# Patient Record
Sex: FEMALE | Race: WHITE | URBAN - METROPOLITAN AREA
[De-identification: names, ages, dates, MRNs, and addresses within clinical notes are randomized per-mention and may not be internally consistent; named-entity substitution may affect disease eponyms.]

---

## 2017-03-12 ENCOUNTER — TELEPHONE (OUTPATIENT)
Dept: NURSING | Facility: CLINIC | Age: 37
End: 2017-03-12

## 2017-03-12 NOTE — TELEPHONE ENCOUNTER
"Call Type: Triage Call    Presenting Problem: I called earlier, and I had double vision. Now it  is gone, it lasted about 30 minutes. I don't have a headache, I do  feel a little dizzy, but I haven't eaten all day. I think I have dry  eye, so I am going to go to the store and get some drops. \" Advised  if this does not help, if symptoms recur or headache develops  to go  to ED as previously advised.  Triage Note:  Guideline Title: Information Only Call; No Symptom Triage (Adult)  Recommended Disposition: Follow-up Assessment Call  Original Inclination: Wanted to speak with a nurse  Override Disposition:  Intended Action: Follow advice given  Physician Contacted: No  Call back to complete assessment/clarification of information from prior caller to  complete triage ?  YES  Sign(s) or symptom(s) associated with a diagnosed condition or with a new illness  ? NO  Requesting information about provider, services or community resources ? NO  Physician Instructions:  Care Advice:  "

## 2017-03-12 NOTE — TELEPHONE ENCOUNTER
"Call Type: Triage Call    Presenting Problem: \"I  have double vison.\"  Patient states she began  to have double vision in both eyes approximately 15 minutes ago.  Triage Note:  Guideline Title: Eye: Pain or Vision Change  Recommended Disposition: See ED Immediately  Original Inclination: Would have called UC  Override Disposition:  Intended Action: Go to Hospital / ED  Physician Contacted: No  Sudden loss or change in vision (double or blurred vision, increased light  sensitivity, partial loss of visual field) AND not previously evaluated ?  YES  Injury to eye OR chemical splash or spray ? NO  Sudden total loss of vision in one or both eyes ? NO  Eye symptoms part of typical migraine headache pattern ? NO  Sudden onset of severe pain in or around or behind eye(s) ? NO  Sudden vision change associated with new difficulty speaking/swallowing; using an  arm/leg; or new one-sided weakness (face drooping) ? NO  Physician Instructions:  Care Advice: Protect the patient from falling or other harm.  Another adult should drive.  Wear dark UV-blocking sunglasses to protect eyes from sun exposure or for  light sensitivity.  IMMEDIATE ACTION  Write down provider's name. List or place the following in a bag for  transport with the patient: current prescription and/or nonprescription  medications  alternative treatments, therapies and medications  and street drugs.  "

## 2017-07-08 ENCOUNTER — HEALTH MAINTENANCE LETTER (OUTPATIENT)
Age: 37
End: 2017-07-08

## 2017-10-26 ENCOUNTER — HOSPITAL ENCOUNTER (INPATIENT)
Facility: CLINIC | Age: 37
LOS: 7 days | Discharge: HOME | DRG: 883 | End: 2017-11-02
Attending: PSYCHIATRY & NEUROLOGY | Admitting: PSYCHIATRY & NEUROLOGY
Payer: MEDICARE

## 2017-10-26 ENCOUNTER — TRANSFERRED RECORDS (OUTPATIENT)
Dept: HEALTH INFORMATION MANAGEMENT | Facility: CLINIC | Age: 37
End: 2017-10-26

## 2017-10-26 DIAGNOSIS — R45.851 SUICIDAL IDEATION: ICD-10-CM

## 2017-10-26 DIAGNOSIS — F41.1 GAD (GENERALIZED ANXIETY DISORDER): ICD-10-CM

## 2017-10-26 DIAGNOSIS — F33.1 MAJOR DEPRESSIVE DISORDER, RECURRENT EPISODE, MODERATE (H): ICD-10-CM

## 2017-10-26 PROBLEM — F32.A DEPRESSED: Status: ACTIVE | Noted: 2017-10-26

## 2017-10-26 LAB
AMPHETAMINES UR QL SCN: NEGATIVE
BARBITURATES UR QL: NEGATIVE
BENZODIAZ UR QL: NEGATIVE
CANNABINOIDS UR QL SCN: NEGATIVE
COCAINE UR QL: NEGATIVE
ETHANOL UR QL SCN: NEGATIVE
HCG UR QL: NEGATIVE
OPIATES UR QL SCN: NEGATIVE

## 2017-10-26 PROCEDURE — 90791 PSYCH DIAGNOSTIC EVALUATION: CPT

## 2017-10-26 PROCEDURE — 99285 EMERGENCY DEPT VISIT HI MDM: CPT | Mod: Z6 | Performed by: PSYCHIATRY & NEUROLOGY

## 2017-10-26 PROCEDURE — 25000132 ZZH RX MED GY IP 250 OP 250 PS 637: Mod: GY | Performed by: PSYCHIATRY & NEUROLOGY

## 2017-10-26 PROCEDURE — 80307 DRUG TEST PRSMV CHEM ANLYZR: CPT | Performed by: FAMILY MEDICINE

## 2017-10-26 PROCEDURE — 80320 DRUG SCREEN QUANTALCOHOLS: CPT | Performed by: FAMILY MEDICINE

## 2017-10-26 PROCEDURE — 81025 URINE PREGNANCY TEST: CPT | Performed by: FAMILY MEDICINE

## 2017-10-26 PROCEDURE — 99285 EMERGENCY DEPT VISIT HI MDM: CPT | Mod: 25 | Performed by: PSYCHIATRY & NEUROLOGY

## 2017-10-26 PROCEDURE — 12400001 ZZH R&B MH UMMC

## 2017-10-26 PROCEDURE — A9270 NON-COVERED ITEM OR SERVICE: HCPCS | Mod: GY | Performed by: PSYCHIATRY & NEUROLOGY

## 2017-10-26 RX ORDER — LAMOTRIGINE 200 MG/1
200 TABLET ORAL 2 TIMES DAILY
COMMUNITY

## 2017-10-26 RX ORDER — HYDROXYZINE HYDROCHLORIDE 25 MG/1
25-50 TABLET, FILM COATED ORAL EVERY 4 HOURS PRN
Status: DISCONTINUED | OUTPATIENT
Start: 2017-10-26 | End: 2017-11-02 | Stop reason: HOSPADM

## 2017-10-26 RX ORDER — OLANZAPINE 10 MG/2ML
5 INJECTION, POWDER, FOR SOLUTION INTRAMUSCULAR
Status: DISCONTINUED | OUTPATIENT
Start: 2017-10-26 | End: 2017-11-01

## 2017-10-26 RX ORDER — TRAZODONE HYDROCHLORIDE 50 MG/1
50 TABLET, FILM COATED ORAL
Status: DISCONTINUED | OUTPATIENT
Start: 2017-10-26 | End: 2017-11-02 | Stop reason: HOSPADM

## 2017-10-26 RX ORDER — LITHIUM CARBONATE 300 MG
300 TABLET ORAL DAILY
Status: ON HOLD | COMMUNITY
End: 2017-11-02

## 2017-10-26 RX ORDER — QUETIAPINE FUMARATE 400 MG/1
400 TABLET, FILM COATED ORAL DAILY
Status: ON HOLD | COMMUNITY
End: 2017-11-02

## 2017-10-26 RX ORDER — LITHIUM CARBONATE 300 MG/1
300 CAPSULE ORAL
Status: DISCONTINUED | OUTPATIENT
Start: 2017-10-26 | End: 2017-11-02 | Stop reason: HOSPADM

## 2017-10-26 RX ORDER — LAMOTRIGINE 100 MG/1
200 TABLET ORAL 2 TIMES DAILY
Status: DISCONTINUED | OUTPATIENT
Start: 2017-10-26 | End: 2017-11-02 | Stop reason: HOSPADM

## 2017-10-26 RX ORDER — ALUMINA, MAGNESIA, AND SIMETHICONE 2400; 2400; 240 MG/30ML; MG/30ML; MG/30ML
30 SUSPENSION ORAL EVERY 4 HOURS PRN
Status: DISCONTINUED | OUTPATIENT
Start: 2017-10-26 | End: 2017-11-02 | Stop reason: HOSPADM

## 2017-10-26 RX ORDER — QUETIAPINE FUMARATE 200 MG/1
400 TABLET, FILM COATED ORAL AT BEDTIME
Status: DISCONTINUED | OUTPATIENT
Start: 2017-10-26 | End: 2017-10-30

## 2017-10-26 RX ORDER — BISACODYL 10 MG
10 SUPPOSITORY, RECTAL RECTAL DAILY PRN
Status: DISCONTINUED | OUTPATIENT
Start: 2017-10-26 | End: 2017-11-02 | Stop reason: HOSPADM

## 2017-10-26 RX ORDER — ACETAMINOPHEN 325 MG/1
650 TABLET ORAL EVERY 4 HOURS PRN
Status: DISCONTINUED | OUTPATIENT
Start: 2017-10-26 | End: 2017-11-02 | Stop reason: HOSPADM

## 2017-10-26 RX ORDER — OLANZAPINE 5 MG/1
5 TABLET ORAL
Status: DISCONTINUED | OUTPATIENT
Start: 2017-10-26 | End: 2017-11-01

## 2017-10-26 RX ADMIN — TRAZODONE HYDROCHLORIDE 50 MG: 50 TABLET ORAL at 22:40

## 2017-10-26 RX ADMIN — QUETIAPINE FUMARATE 400 MG: 200 TABLET, FILM COATED ORAL at 22:32

## 2017-10-26 RX ADMIN — LITHIUM CARBONATE 300 MG: 300 CAPSULE, GELATIN COATED ORAL at 22:32

## 2017-10-26 RX ADMIN — LAMOTRIGINE 200 MG: 100 TABLET ORAL at 22:32

## 2017-10-26 ASSESSMENT — ACTIVITIES OF DAILY LIVING (ADL)
COGNITION: 1 - ATTENTION OR MEMORY DEFICITS
TOILETING: 0-->INDEPENDENT
BATHING: 0-->INDEPENDENT
RETIRED_EATING: 0-->INDEPENDENT
FALL_HISTORY_WITHIN_LAST_SIX_MONTHS: NO
DRESS: 0-->INDEPENDENT
SWALLOWING: 0-->SWALLOWS FOODS/LIQUIDS WITHOUT DIFFICULTY
TRANSFERRING: 0-->INDEPENDENT
PRIOR_FUNCTIONAL_LEVEL_COMMENT: OK
AMBULATION: 0-->INDEPENDENT
RETIRED_COMMUNICATION: 0-->UNDERSTANDS/COMMUNICATES WITHOUT DIFFICULTY

## 2017-10-26 ASSESSMENT — ENCOUNTER SYMPTOMS
SLEEP DISTURBANCE: 1
MUSCULOSKELETAL NEGATIVE: 1
GASTROINTESTINAL NEGATIVE: 1
NEUROLOGICAL NEGATIVE: 1
CARDIOVASCULAR NEGATIVE: 1
ENDOCRINE NEGATIVE: 1
HEMATOLOGIC/LYMPHATIC NEGATIVE: 1
NERVOUS/ANXIOUS: 1
RESPIRATORY NEGATIVE: 1
HYPERACTIVE: 0
EYES NEGATIVE: 1
HALLUCINATIONS: 0
DECREASED CONCENTRATION: 1
CONSTITUTIONAL NEGATIVE: 1

## 2017-10-26 NOTE — IP AVS SNAPSHOT
[address]    [address] [place] [id number]    Phone:  [phone]                                       After Visit Summary   10/26/2017    Cinda Rowland    MRN: [id number]           After Visit Summary Signature Page     I have received my discharge instructions, and my questions have been answered. I have discussed any challenges I see with this plan with the nurse or doctor.    ..........................................................................................................................................  Patient/Patient Representative Signature      ..........................................................................................................................................  Patient Representative Print Name and Relationship to Patient    ..................................................               ................................................  Date                                            Time    ..........................................................................................................................................  Reviewed by Signature/Title    ...................................................              ..............................................  Date                                                            Time

## 2017-10-26 NOTE — ED PROVIDER NOTES
History     Chief Complaint   Patient presents with     Suicidal     having suicidal thoughts.  Plan to drink vodka while taking bath to unconciousness.  Saw psychiatrist 5 weeks ago due ti increasing depression.  Medication changes were made.  has gotten worse since medication changes.      The history is provided by the patient.     Cinda Rowland is a 37 year old female who is here referred by her psychiatrist as she feels hopeless, helpless and suicidal. Patient has long history of alleged bipolar illness. She has been resistant to treatment and had ECT in 2015 through [facility]. Patient has history of suicide attempt by trying to drink alcohol then overdosing on klonopin. She reports her psychiatrist has been altering her meds but without much benefit. She had discussed idea of ECT. Her psychiatrist felt [facility] would be a better place to get it over [facility]. Patient has not been here previously. She has history of being hospitalized both through [facility] and [facility]. She is  and has a child. She has been irritable and feels her mood has been stressing out her marriage. She feels guilty. She has trouble sleeping and eating. She feels hopeless and suicidal. She has trouble focusing and making decisions. There is no thought disorder. She denies using drugs. She denies acute medical concerns.    Please see DEC Crisis Assessment on 10/26/17 in [facility] for further details.    PERSONAL MEDICAL HISTORY  Past Medical History:   Diagnosis Date     Adjustment disorder with mixed anxiety and depressed mood      PAST SURGICAL HISTORY  Past Surgical History:   Procedure Laterality Date     NO HISTORY OF SURGERY       FAMILY HISTORY  Family History   Problem Relation Age of Onset     Unknown/Adopted No family hx of      SOCIAL HISTORY  Social History   Substance Use Topics     Smoking status: Never Smoker     Smokeless tobacco: Never Used     Alcohol use No     MEDICATIONS  No current  facility-administered medications for this encounter.      Current Outpatient Prescriptions   Medication     TERAZOL 7 0.4 % VA CREA     BORIC ACID POWD     WOMENS DAILY MULTIVITAMIN OR TABS     LEXAPRO 20 MG OR TABS     ALLERGIES  Allergies   Allergen Reactions     Seasonal Allergies          I have reviewed the Medications, Allergies, Past Medical and Surgical History, and Social History in the Epic system.    Review of Systems   Constitutional: Negative.    HENT: Negative.    Eyes: Negative.    Respiratory: Negative.    Cardiovascular: Negative.    Gastrointestinal: Negative.    Endocrine: Negative.    Genitourinary: Negative.    Musculoskeletal: Negative.    Skin: Negative.    Neurological: Negative.    Hematological: Negative.    Psychiatric/Behavioral: Positive for decreased concentration, sleep disturbance and suicidal ideas. Negative for hallucinations. The patient is nervous/anxious. The patient is not hyperactive.    All other systems reviewed and are negative.      Physical Exam   BP: 156/89  Pulse: 120  Temp: 97.6  F (36.4  C)  Resp: 18  Weight: 66.4 kg (146 lb 7 oz)  SpO2: 98 %      Physical Exam   Constitutional: She appears well-developed and well-nourished.   HENT:   Head: Normocephalic.   Eyes: Pupils are equal, round, and reactive to light.   Neck: Normal range of motion.   Cardiovascular: Normal rate.    Pulmonary/Chest: Effort normal.   Abdominal: Soft.   Musculoskeletal: Normal range of motion.   Neurological: She is alert.   Skin: Skin is warm.   Psychiatric: Judgment normal. Her mood appears anxious. Her speech is tangential. She is not agitated, not aggressive, not hyperactive, not actively hallucinating and not combative. Thought content is not paranoid and not delusional. Cognition and memory are normal. She exhibits a depressed mood. She expresses suicidal ideation. She expresses no homicidal ideation. She is inattentive.   Nursing note and vitals reviewed.      ED Course     ED Course      Procedures      Labs Ordered and Resulted from Time of ED Arrival Up to the Time of Departure from the ED   DRUG ABUSE SCREEN 6 CHEM DEP URINE ([facility])   HCG QUALITATIVE URINE            Assessments & Plan (with Medical Decision Making)   Patient with MDD, recurrent and REJI who is feeling suicidal and unsafe. She is referred for admission.    I have reviewed the nursing notes.    I have reviewed the findings, diagnosis, plan and need for follow up with the patient.    New Prescriptions    No medications on file       Final diagnoses:   Major depressive disorder, recurrent episode, moderate (H)   REJI (generalized anxiety disorder)   Suicidal ideation       10/26/2017   [facility], [place], EMERGENCY DEPARTMENT     Cipriano Meneses MD  10/26/17 8911

## 2017-10-26 NOTE — ED NOTES
"Patient reports suicidal ideation since recent medication change about 1 1/2 week ago. Patient stated \"I have been hostile, angry and think about taking tylenol and drinking in the bath tub.\" Patient reported stabbing a box repeatedly and thinking about stabbing herself.   "

## 2017-10-26 NOTE — IP AVS SNAPSHOT
MRN:[id number]                      After Visit Summary   10/26/2017    Cinda Rowland    MRN: [id number]           Thank you!     Thank you for choosing [facility] for your care. Our goal is always to provide you with excellent care.        Patient Information     Date Of Birth          1980        Designated Caregiver       Most Recent Value    Caregiver    Will someone help with your care after discharge? no      About your hospital stay     You were admitted on:  October 26, 2017 You last received care in the:  [facility]    You were discharged on:  November 2, 2017        Reason for your hospital stay       Suicidal thoughts                  Who to Call     For medical emergencies, please call 911.  For non-urgent questions about your medical care, please call your primary care provider or clinic, [phone]          Attending Provider     Provider Specialty    Cipriano Meneses MD Psychiatry    [facility]    [facility], Lisette Villafuerte MD Psychiatry       Primary Care Provider Office Phone # Fax #    Lacie Pitts [phone]      After Care Instructions     Activity       Your activity upon discharge: activity as tolerated            Diet       Follow this diet upon discharge: Regular                  Follow-up Appointments     Adult [facility] Follow-up and recommended labs and tests       Follow up with Dr. Randall within 1 month for hospital follow-up.    Attend Day Treatment as scheduled for you.    Appointments on [facility] and/or [facility] (with [facility] or [facility] provider or service). Call [phone] if you haven't heard regarding these appointments within 7 days of discharge.                  Your next 10 appointments already scheduled     Nov 09, 2017  9:00 AM CST   Evaluation with [facility])    [address] [place] [id number]    [phone]              Further instructions from your care team        Behavioral Discharge Planning and Instructions      Summary:  You were admitted on 10/26/2017  due to Anxiety and Major Depressive Disorder, recurrent; severe.  You were treated by Dr. Lisette Dye MD and discharged on 11/2/17 from [facility] to your home to continue to follow-up with your providers listed below.    Principal Diagnosis: Major Depressive Disorder; Recurrent, Severe; Anxiety disorder;      Health Care Follow-up Appointments:   Dr. Lacie Randall (Psychiatric Med [facility]) - Next Appt on Wed Nov 8th at 9:30am  [facility]  [address]  [place]  [address] [phone]  [phone]    Therapist Nora Fernandez (Pt to schedule at your convenience after discharge)  [facility]  [address] [address] [address]  [place], [place]  [address] [phone]    [facility] Intake on Thursday, Nov 9th at 9am with Marilyn Iraheta in Room 14 - [facility].  If you have questions, their number is [phone].    Other Mental Health Clinic Resources per your request:  [facility] [phone]  [facility]iatric [facility] [phone]  [facility] [phone]    DBT Referral: [facility]  When you are nearing completion of the Adult Day Program, call them to set up an Intake.      Attend all scheduled appointments with your outpatient providers. Call at least 24 hours in advance if you need to reschedule an appointment to ensure continued access to your outpatient providers.   Major Treatments, Procedures and Findings:  You were provided with: a psychiatric assessment, assessed for medical stability, medication evaluation and/or management, group therapy, milieu management and medical interventions    Symptoms to Report: losing more sleep, mood getting  "worse or thoughts of suicide    Early warning signs can include: increased depression or anxiety sleep disturbances increased thoughts or behaviors of suicide or self-harm     Safety and Wellness:  Take all medicines as directed.  Make no changes unless your doctor suggests them.      Follow treatment recommendations.  Refrain from alcohol and non-prescribed drugs.  If there is a concern for safety, call 911.    Resources:   Crisis Intervention: 907.198.2034 or 460-343-3579 (TTY: 201.167.2328).  Call anytime for help.  National [place] on Mental Illness (www.[place].margaret.org): 523.466.3532 or 667-681-1175.    The treatment team has appreciated the opportunity to work with you.     If you have any questions or concerns our unit number is [phone].        Pending Results     No orders found from 10/24/2017 to 10/27/2017.            Admission Information     Date & Time Provider Department Dept. Phone    10/26/2017 Lisette Dye MD [facility] [phone]      Your Vitals Were     Blood Pressure Pulse Temperature Respirations Height Weight    122/82 (BP Location: Left arm) 91 97.7  F (36.5  C) (Oral) 16 1.676 m (5' 6\") 65.2 kg (143 lb 12.8 oz)    Last Period Pulse Oximetry BMI (Body Mass Index)             10/10/2017 99% 23.21 kg/m2         Private Outlethart Information     [employer] gives you secure access to your electronic health record. If you see a primary care provider, you can also send messages to your care team and make appointments. If you have questions, please call your primary care clinic.  If you do not have a primary care provider, please call [phone] and they will assist you.        Care EveryWhere ID     This is your Care EveryWhere ID. This could be used by other organizations to access your [facility] medical records  [id number]        Equal Access to Services     BUSHRA JEFFERS : Gabriel Santana, wagallito macedo, qaybta aldo swain, chrissy uribe. " So [facility] [place] [facility] [phone].    ATENCIÓN: Si habla rosana, tiene a rose disposición servicios gratuitos de asistencia lingüística. Chris al [phone].    We comply with applicable federal civil rights laws and [place] laws. We do not discriminate on the basis of race, color, national origin, age, disability, sex, sexual orientation, or gender identity.               Review of your medicines      START taking        Dose / Directions    lithium 450 MG CR tablet   Commonly known as:  ESKALITH   Used for:  Major depressive disorder, recurrent episode, moderate (H)   Replaces:  lithium 300 MG tablet        Dose:  450 mg   Take 1 tablet (450 mg) by mouth daily   Quantity:  30 tablet   Refills:  0       PARoxetine 30 MG tablet   Commonly known as:  PAXIL   Used for:  Major depressive disorder, recurrent episode, moderate (H)        Dose:  30 mg   Take 1 tablet (30 mg) by mouth At Bedtime   Quantity:  30 tablet   Refills:  0       propranolol 20 MG tablet   Commonly known as:  INDERAL   Used for:  REJI (generalized anxiety disorder)        Dose:  20 mg   Take 1 tablet (20 mg) by mouth 3 times daily   Quantity:  90 tablet   Refills:  0         CONTINUE these medicines which may have CHANGED, or have new prescriptions. If we are uncertain of the size of tablets/capsules you have at home, strength may be listed as something that might have changed.        Dose / Directions    QUEtiapine 300 MG tablet   Commonly known as:  SEROquel   This may have changed:    - medication strength  - how much to take  - when to take this  - additional instructions   Used for:  Major depressive disorder, recurrent episode, moderate (H)        Dose:  300 mg   Take 1 tablet (300 mg) by mouth At Bedtime   Quantity:  30 tablet   Refills:  0         CONTINUE these medicines which have NOT CHANGED        Dose / Directions    lamoTRIgine 200 MG tablet   Commonly known as:  LaMICtal        Dose:  200 mg   Take 200 mg by mouth 2 times daily   Refills:  0          STOP taking     lithium 300 MG tablet   Replaced by:  lithium 450 MG CR tablet           WOMENS DAILY MULTIVITAMIN Tabs                Where to get your medicines      Some of these will need a paper prescription and others can be bought over the counter. Ask your nurse if you have questions.     Bring a paper prescription for each of these medications     lithium 450 MG CR tablet    PARoxetine 30 MG tablet    propranolol 20 MG tablet    QUEtiapine 300 MG tablet                Protect others around you: Learn how to safely use, store and throw away your medicines at www.disposemymeds.org.             Medication List: This is a list of all your medications and when to take them. Check marks below indicate your daily home schedule. Keep this list as a reference.      Medications           Morning Afternoon Evening Bedtime As Needed    lamoTRIgine 200 MG tablet   Commonly known as:  LaMICtal   Take 200 mg by mouth 2 times daily   Last time this was given:  200 mg on 11/2/2017  9:07 AM                                lithium 450 MG CR tablet   Commonly known as:  ESKALITH   Take 1 tablet (450 mg) by mouth daily                                PARoxetine 30 MG tablet   Commonly known as:  PAXIL   Take 1 tablet (30 mg) by mouth At Bedtime   Last time this was given:  20 mg on 11/2/2017  9:07 AM                                propranolol 20 MG tablet   Commonly known as:  INDERAL   Take 1 tablet (20 mg) by mouth 3 times daily   Last time this was given:  20 mg on 11/2/2017  2:23 PM                                QUEtiapine 300 MG tablet   Commonly known as:  SEROquel   Take 1 tablet (300 mg) by mouth At Bedtime   Last time this was given:  300 mg on 11/1/2017  9:10 PM

## 2017-10-27 LAB
ALBUMIN SERPL-MCNC: 3.4 G/DL (ref 3.4–5)
ALP SERPL-CCNC: 58 U/L (ref 40–150)
ALT SERPL W P-5'-P-CCNC: 15 U/L (ref 0–50)
ANION GAP SERPL CALCULATED.3IONS-SCNC: 6 MMOL/L (ref 3–14)
AST SERPL W P-5'-P-CCNC: 11 U/L (ref 0–45)
BASOPHILS # BLD AUTO: 0 10E9/L (ref 0–0.2)
BASOPHILS NFR BLD AUTO: 0.2 %
BILIRUB SERPL-MCNC: 0.3 MG/DL (ref 0.2–1.3)
BUN SERPL-MCNC: 12 MG/DL (ref 7–30)
CALCIUM SERPL-MCNC: 8.6 MG/DL (ref 8.5–10.1)
CHLORIDE SERPL-SCNC: 107 MMOL/L (ref 94–109)
CHOLEST SERPL-MCNC: 220 MG/DL
CO2 SERPL-SCNC: 27 MMOL/L (ref 20–32)
CREAT SERPL-MCNC: 0.65 MG/DL (ref 0.52–1.04)
DIFFERENTIAL METHOD BLD: NORMAL
EOSINOPHIL # BLD AUTO: 0.1 10E9/L (ref 0–0.7)
EOSINOPHIL NFR BLD AUTO: 1 %
ERYTHROCYTE [DISTWIDTH] IN BLOOD BY AUTOMATED COUNT: 12.1 % (ref 10–15)
GFR SERPL CREATININE-BSD FRML MDRD: >90 ML/MIN/1.7M2
GLUCOSE SERPL-MCNC: 95 MG/DL (ref 70–99)
HCT VFR BLD AUTO: 38 % (ref 35–47)
HDLC SERPL-MCNC: 59 MG/DL
HGB BLD-MCNC: 12.7 G/DL (ref 11.7–15.7)
IMM GRANULOCYTES # BLD: 0 10E9/L (ref 0–0.4)
IMM GRANULOCYTES NFR BLD: 0.2 %
LDLC SERPL CALC-MCNC: 139 MG/DL
LYMPHOCYTES # BLD AUTO: 1.4 10E9/L (ref 0.8–5.3)
LYMPHOCYTES NFR BLD AUTO: 26.2 %
MCH RBC QN AUTO: 30.2 PG (ref 26.5–33)
MCHC RBC AUTO-ENTMCNC: 33.4 G/DL (ref 31.5–36.5)
MCV RBC AUTO: 91 FL (ref 78–100)
MONOCYTES # BLD AUTO: 0.6 10E9/L (ref 0–1.3)
MONOCYTES NFR BLD AUTO: 11.2 %
NEUTROPHILS # BLD AUTO: 3.2 10E9/L (ref 1.6–8.3)
NEUTROPHILS NFR BLD AUTO: 61.2 %
NONHDLC SERPL-MCNC: 161 MG/DL
NRBC # BLD AUTO: 0 10*3/UL
NRBC BLD AUTO-RTO: 0 /100
PLATELET # BLD AUTO: 204 10E9/L (ref 150–450)
POTASSIUM SERPL-SCNC: 4.1 MMOL/L (ref 3.4–5.3)
PROT SERPL-MCNC: 6.9 G/DL (ref 6.8–8.8)
RBC # BLD AUTO: 4.2 10E12/L (ref 3.8–5.2)
SODIUM SERPL-SCNC: 140 MMOL/L (ref 133–144)
TRIGL SERPL-MCNC: 112 MG/DL
TSH SERPL DL<=0.005 MIU/L-ACNC: 1.36 MU/L (ref 0.4–4)
WBC # BLD AUTO: 5.3 10E9/L (ref 4–11)

## 2017-10-27 PROCEDURE — 85025 COMPLETE CBC W/AUTO DIFF WBC: CPT | Performed by: PSYCHIATRY & NEUROLOGY

## 2017-10-27 PROCEDURE — 25000132 ZZH RX MED GY IP 250 OP 250 PS 637: Mod: GY | Performed by: PSYCHIATRY & NEUROLOGY

## 2017-10-27 PROCEDURE — 12400001 ZZH R&B MH UMMC

## 2017-10-27 PROCEDURE — A9270 NON-COVERED ITEM OR SERVICE: HCPCS | Mod: GY | Performed by: PSYCHIATRY & NEUROLOGY

## 2017-10-27 PROCEDURE — 84443 ASSAY THYROID STIM HORMONE: CPT | Performed by: PSYCHIATRY & NEUROLOGY

## 2017-10-27 PROCEDURE — 80061 LIPID PANEL: CPT | Performed by: PSYCHIATRY & NEUROLOGY

## 2017-10-27 PROCEDURE — 80053 COMPREHEN METABOLIC PANEL: CPT | Performed by: PSYCHIATRY & NEUROLOGY

## 2017-10-27 PROCEDURE — 36415 COLL VENOUS BLD VENIPUNCTURE: CPT | Performed by: PSYCHIATRY & NEUROLOGY

## 2017-10-27 PROCEDURE — 99223 1ST HOSP IP/OBS HIGH 75: CPT | Mod: AI | Performed by: PSYCHIATRY & NEUROLOGY

## 2017-10-27 RX ORDER — LORATADINE 10 MG/1
10 TABLET, ORALLY DISINTEGRATING ORAL 2 TIMES DAILY PRN
Status: DISCONTINUED | OUTPATIENT
Start: 2017-10-27 | End: 2017-11-02 | Stop reason: HOSPADM

## 2017-10-27 RX ADMIN — LAMOTRIGINE 200 MG: 100 TABLET ORAL at 09:15

## 2017-10-27 RX ADMIN — QUETIAPINE FUMARATE 400 MG: 200 TABLET, FILM COATED ORAL at 20:23

## 2017-10-27 RX ADMIN — TRAZODONE HYDROCHLORIDE 50 MG: 50 TABLET ORAL at 21:21

## 2017-10-27 RX ADMIN — LORATADINE 10 MG: 10 TABLET, ORALLY DISINTEGRATING ORAL at 22:13

## 2017-10-27 RX ADMIN — LAMOTRIGINE 200 MG: 100 TABLET ORAL at 20:23

## 2017-10-27 RX ADMIN — LITHIUM CARBONATE 300 MG: 300 CAPSULE, GELATIN COATED ORAL at 20:24

## 2017-10-27 ASSESSMENT — ACTIVITIES OF DAILY LIVING (ADL)
DRESS: INDEPENDENT
GROOMING: INDEPENDENT
ORAL_HYGIENE: INDEPENDENT
DRESS: INDEPENDENT
LAUNDRY: WITH SUPERVISION
GROOMING: INDEPENDENT
ORAL_HYGIENE: INDEPENDENT
LAUNDRY: WITH SUPERVISION

## 2017-10-27 NOTE — PROGRESS NOTES
Initial Psychosocial Assessment    I have reviewed the chart, met with the patient, and developed Care Plan. Information for assessment was obtained from: Pt, medical record                                                        Voluntary    Presenting Problem:     Pt admitted for suicidal ideation in the context of a recent medication change. She reports being verbally aggressive with family and .       History of Mental Health and Chemical Dependency:    Pt has had over 15 hospitalizations at [facility] and [facility] over the past four years. This is her first hospitalization at [facility]    History of ECT    Significant Life Events   (Illness, Abuse, Trauma, Death): adopted at age 1 due to bio mom's mental health issues    Family:  one son age 2    Living Situation: lives with  and son      Educational Background: [employer] degree in Jainism studies       Financial Status: unemployed- SSDI    Legal Issues:  none    Ethnic/Cultural Issues: none    Spiritual Orientation:  Sabianism     Service History: none    Social Functioning (organization, interests): reading Jainism studies and novels, movies and drawing    Current Treatment Providers are:    Lacie Randall- [facility] mental Health and addiction    Therapist Nora Fernandez- [facility] counseling    Social Service Assessment/Plan:     Provide safe environment  CTC to contact family   Manage meds  Provide a psychological assessment

## 2017-10-27 NOTE — PHARMACY-ADMISSION MEDICATION HISTORY
"Admission Medication History status for the 10/26/2017 admission is complete.  See EPIC admission navigator for Prior to Admission medications.    Medication history sources:  Patient and Care Everywhere     Medication history source reliability: Good- patient knew names/dose of her meds    Medication adherence:  Good    Changes made to PTA medication list (reason)  Added: Lamotrigine 200 mg tab BID, quetiapine 400 mg daily, lithium carbonate 300 mg daily  Deleted: Boric acid powder, lexapro 20 mg tabs, terazol 7 0.4% vaginal cream  Changed: None    Additional medication history information (including reliability of information, actions taken by pharmacist):   -Patient states she has not used the boric acid, lexapro, or terazol cream in \"quite some time.\"  -Patient has not had her evening lamotrigine 200 mg dose.  -Verified all doses on Care Everywhere with recent visit to [facility].    Time spent in this activity: 20 minutes    Medication history completed by: Soha Urrutia, PD2 Pharmacy Intern    Prior to Admission medications    Medication Sig Last Dose Taking? Auth Provider   lamoTRIgine (LAMICTAL) 200 MG tablet Take 200 mg by mouth 2 times daily 10/26/2017 at AM Yes Unknown, Entered By History   QUEtiapine (SEROQUEL) 400 MG tablet Take 400 mg by mouth daily Take 1 tab by mouth every evening 10/25/2017 at PM Yes Unknown, Entered By History   lithium 300 MG tablet Take 300 mg by mouth daily Take 1 tab by mouth every night 10/25/2017 at PM Yes Unknown, Entered By History   WOMENS DAILY MULTIVITAMIN OR TABS 1 TABLET DAILY 10/25/2017 at AM Yes Callie Spaulding APRN CNM     "

## 2017-10-27 NOTE — PROGRESS NOTES
Pt was slightly visible in the milieu, reading and than went back to he room to nap. Complaints nausea and stated that she believes it's from her withdraws. Reports feeling groggy, irritated, depressed and sad. Denied SI/SIB and hallucinations.      10/27/17 1531   Behavioral Health   Hallucinations denies / not responding to hallucinations   Thinking distractable   Orientation person: oriented;place: oriented;date: oriented;time: oriented   Memory baseline memory   Insight poor   Judgement impaired   Eye Contact at examiner   Affect blunted, flat;sad;irritable   Mood depressed;irritable   Physical Appearance/Attire appears stated age   Hygiene well groomed   Suicidality other (see comments)  (denied )   Self Injury other (see comment)  (denied )   Elopement (none )   Activity isolative;withdrawn   Speech clear;coherent   Medication Sensitivity no stated side effects   Psychomotor / Gait balanced   Psycho Education   Type of Intervention 1:1 intervention   Response participates, initiates socially appropriate   Hours 0.5   Treatment Detail (check in)   Activities of Daily Living   Hygiene/Grooming independent   Oral Hygiene independent   Dress independent   Laundry with supervision   Room Organization independent   Groups   Details (attended community meeting only)   Behavioral Health Interventions   Depression maintain safety precautions;encourage nutrition and hydration;encourage participation / independence with adls;provide emotional support;establish therapeutic relationship;assist with developing and utilizing healthy coping strategies;build upon strengths   Social and Therapeutic Interventions (Depression) encourage socialization with peers;encourage participation in therapeutic groups and milieu activities

## 2017-10-27 NOTE — PROGRESS NOTES
10/26/17 2158   Patient Belongings   Did you bring any home meds/supplements to the hospital?  Yes   Disposition of meds  Sent to security/pharmacy per site process   Patient Belongings cell phone/electronics;clothing;glasses;purse;shoes   Disposition of Belongings Kept with patient;Other (see comment)   Belongings Search Yes   Clothing Search Yes   Second Staff Arleth and dalila VARGAS  did the search, Tete did the belongings after     Belongings with pt or in locker: cell phone, purse, cosmetics bag, lipstick x 2, book, boots, socks, undergarments, jeans, shirt, bookmark,     Medications that the pt brought were given to her nurse per policy  A               Admission:  I am responsible for any personal items that are not sent to the safe or pharmacy.  [facility] is not responsible for loss, theft or damage of any property in my possession.    Signature:  _________________________________ Date: _______  Time: _____                                              Staff Signature:  ____________________________ Date: ________  Time: _____      2nd Staff person, if patient is unable/unwilling to sign:    Signature: ________________________________ Date: ________  Time: _____     Discharge:  [facility] has returned all of my personal belongings:    Signature: _________________________________ Date: ________  Time: _____                                          Staff Signature:  ____________________________ Date: ________  Time: _____

## 2017-10-27 NOTE — PLAN OF CARE
"Problem: Depressive Symptoms  Goal: Depressive Symptoms  Signs and symptoms of listed problems will be absent or manageable.  ADMIT: This is the first admit for this young lady of 38 yo. Pt felt suicidal tonight because \"life\" has been very overwhelming. Pt has previous DX of ASD, bipolar, depression & anxiety. Pt states it doesn't help that her psychiatrist has changed her meds and this has made her \"feel worse\". Pt states she feels \"hostile\" towards herself. Pt has a 3 yo son at home along with her . Pt has had a suicide attempt in the past by OD when she was younger. Pt is vol and has contracted for safety and has settled well on the unit at this time.      "

## 2017-10-27 NOTE — PLAN OF CARE
Problem: Patient Care Overview  Goal: Team Discussion  Team Plan:   BEHAVIORAL TEAM DISCUSSION     Participants: Dr. Mendoza, CTC ZANE Alatorre  Progress: new admission, further assessment needed  Continued Stay Criteria/Rationale: suicidal ideation     Precautions:   Behavioral Orders   Procedures     Code 1 - Restrict to Unit     Routine Programming       As clinically indicated     Status 15       Every 15 minutes.     Suicide precautions     Plan: provide safe environment, assess further, CTC to contact family for additional information  Rationale for change in precautions or plan: no change

## 2017-10-28 PROCEDURE — A9270 NON-COVERED ITEM OR SERVICE: HCPCS | Mod: GY | Performed by: PSYCHIATRY & NEUROLOGY

## 2017-10-28 PROCEDURE — 90853 GROUP PSYCHOTHERAPY: CPT

## 2017-10-28 PROCEDURE — 12400007 ZZH R&B MH INTERMEDIATE UMMC

## 2017-10-28 PROCEDURE — 25000132 ZZH RX MED GY IP 250 OP 250 PS 637: Mod: GY | Performed by: PSYCHIATRY & NEUROLOGY

## 2017-10-28 RX ADMIN — LAMOTRIGINE 200 MG: 100 TABLET ORAL at 09:09

## 2017-10-28 RX ADMIN — LITHIUM CARBONATE 300 MG: 300 CAPSULE, GELATIN COATED ORAL at 20:37

## 2017-10-28 RX ADMIN — QUETIAPINE FUMARATE 400 MG: 200 TABLET, FILM COATED ORAL at 20:39

## 2017-10-28 RX ADMIN — TRAZODONE HYDROCHLORIDE 50 MG: 50 TABLET ORAL at 20:39

## 2017-10-28 RX ADMIN — HYDROXYZINE HYDROCHLORIDE 50 MG: 25 TABLET ORAL at 17:03

## 2017-10-28 RX ADMIN — LAMOTRIGINE 200 MG: 100 TABLET ORAL at 20:37

## 2017-10-28 RX ADMIN — HYDROXYZINE HYDROCHLORIDE 50 MG: 25 TABLET ORAL at 12:13

## 2017-10-28 RX ADMIN — LORATADINE 10 MG: 10 TABLET, ORALLY DISINTEGRATING ORAL at 21:44

## 2017-10-28 ASSESSMENT — ACTIVITIES OF DAILY LIVING (ADL)
GROOMING: INDEPENDENT
LAUNDRY: WITH SUPERVISION
DRESS: INDEPENDENT
ORAL_HYGIENE: INDEPENDENT
GROOMING: INDEPENDENT
LAUNDRY: WITH SUPERVISION
DRESS: INDEPENDENT
ORAL_HYGIENE: INDEPENDENT

## 2017-10-28 NOTE — PROGRESS NOTES
"Pt requesting to have family meet or talk with her DROrlin To help advocate for her. Pt reports she is \"beside myself\" with all the med changes and feels she cant go on like this. Pt denies feeling suicidal denies she wants to kill her self. Pt would like a second opinion and to talk with Dr. Tucker regarding ECT. Pt has 2 year old at home and states that is the best thing she is doing in her life taking care of him. Pt also reports she was abruptly discontinued from her effexor by outpt provider.  "

## 2017-10-28 NOTE — PLAN OF CARE
Problem: Depressive Symptoms  Goal: Social and Therapeutic (Depression)  Signs and symptoms of listed problems will be absent or manageable.      INITIAL OT ATTENDANCE: Attended 1.0  of 1.0 possible O.T. groups. Pleasant, social. Easily motivated to activity, comprehended demonstrated direction and was pleased with outcome. Initially seen by OT on this date. More observation needed to complete initial evaluation at this time.  Patient will be given a Self Assessment form. OT staff will explain the value of including them in their treatment plan and offer options to meet their needs and identified goals.

## 2017-10-28 NOTE — PROGRESS NOTES
Pt showered this shift, and was visible in the milieu on and off throughout the evening. She requested trazodone for sleep, as well as Claritin for her congestion.    No additional concerns at this time. Will continue to monitor and assess.

## 2017-10-28 NOTE — PROGRESS NOTES
"   10/27/17 2200   Behavioral Health   Hallucinations denies / not responding to hallucinations   Thinking paranoid;poor concentration   Orientation person: oriented;place: oriented   Memory baseline memory   Insight poor   Judgement impaired   Eye Contact at examiner   Affect full range affect   Mood mood is calm   Physical Appearance/Attire neat   Hygiene well groomed   Suicidality thoughts only   Self Injury other (see comment)  (denies)   Elopement (None stated or observed )   Activity other (see comment)  (visible in the milieu and socialized with others )   Speech clear;coherent   Activities of Daily Living   Hygiene/Grooming independent   Oral Hygiene independent   Dress independent   Laundry with supervision   Room Organization independent       Pt reported SI thoughts only.  Pt denied SIB.  Pt reported feeling agitated (7), irritated (7) and sad (7) \" paranoid with other patient just saefty.\"  Pt reported overall \" I feel sad overly sensitive to sad stuff.\"  Pt seems calm, ate supper, showered, did not attended group, but was visible in the milieu and socialized with others.  Pt daily goal \" I just wanted to exercise and take a shower.\" Pt goal after discharge \" put more effort into managing my mental health try to reduce stress.\"  Pt wants visitors.    "

## 2017-10-28 NOTE — H&P
Cinda Rowland was seen for 70 minutes on 10/27/2017.  Greater than 50% of this time was spent in counseling, coordinating care, clarifying diagnostic prognostic issues and presence of support in community.      CHIEF COMPLAINT AND REASON FOR ADMISSION:  The patient is a 37-year-old  female sent from outpatient psychiatric clinic due to depression and suicidal thoughts.      HISTORY OF PRESENT ILLNESS:  The patient states that she most of her life she had been unstable.  It sounds like she had a few days longer period of elevated mood with decreased need for sleep, grandiose ideas; for example, thinking about taking lessons for pole dancing, speaking fast.  This episode lasted for only a few days between bouts of severe depression.  During this severe depression episode, patient said that she contacted her psychiatrist, Dr. Lacie Pitts, who discontinued her Effexor cold turkey and started patient on lithium.  She was also started on Lamictal and Viibryd.  The patient reports becoming very irritable a short time after these medication changes.  Reported poor sleep, a fluctuating appetite, thoughts of self-harm.  Patient reports that she has temper and that her verbal aggression has become far worse.      PAST PSYCHIATRIC HISTORY:  She reports being diagnosed in the past with autism spectrum disorder, bipolar versus bipolar type 2.  She did report history of self-injurious behavior but not recently.  However had multiple prior overdose attempts and hospitalizations at [facility] in [facility].  She has had series of ECT treatments, most recently in 08/2016.  However in 12/2016, she was again hospitalized after she overdosed on her Klonopin together with alcohol and was found to have passed out by .  She reports that she cannot handle her emotions and can only think of dying.  She is considering overdose again.  She describes herself as very infrequent  alcohol drinker and denies using street drugs or abusing prescription pills.  Patient sees a therapist Nora Fernandez every 2 weeks or so and psychiatrist, Dr. Lacie Pitts.  A 12-point review of system was positive for mental health, otherwise negative.      PAST MEDICAL HISTORY:  There is no significant medical history.      PAST SURGICAL HISTORY:  No history of surgery.        ALLERGIES:  The patient reported seasonal allergies.      HOME MEDICATIONS:   1.  Lithium 300 mg daily at night.   2.  Lamotrigine 200 mg 2 times a day.   3.  Seroquel 400 mg at bedtime.      PHYSICAL EXAMINATION:  Please refer to Dr. Meneses's note from 10/26/2017.      FAMILY HISTORY AND SOCIAL HISTORY:  Patient was adopted at birth.  Birth mother was someone with schizophrenia and her birth father was .  She was raised with no siblings, graduated from [employer] in  and earned a degree from [employer] in [employer] studies.  She worked in an office until 4 years ago, grew up in [place],  in , now is a homemaker.  The couple has 2-year-old son.      VITAL SIGNS:  Temperature 97.7, respirations 16, blood pressure 117/70, heart rate 112.      MENTAL STATUS EXAMINATION:  The patient is a  female, pleasant, cooperative on approach.  Speech is spontaneous, normal rate, normal volume.  She maintains fair eye contact.  She looks sad and admits to not feeling safe being at home alone taking care of the 2-year-old son, reports feeling worthless, having sleep problems.  Denied homicidal thoughts.  She is concerned about her anger, though.  Thought processes are linear, goal directed.  There is no evidence of psychosis, hypermania or eugenie.  She is alert, oriented x3.  Affect is sad, congruent with mood.  There is no evidence of abnormal involuntary movements.  I would describe the patient's insight and judgment both as fair.      IMPRESSION:  Bipolar type 2 disorder, generalized anxiety disorder,  possible borderline personality disorder versus borderline personality disorder traits.        TREATMENT PLAN:  Patient will continue to be hospitalized on a voluntary basis.  She will be started on Seroquel, lithium and lamotrigine with possible adjustment of doses based on her symptoms.  I had a long discussion about patient's past medication trials.  I asked the RN to provide patient with printout about borderline personality disorder and bipolar affective disorder.  We agreed that she would discuss it with her  and will talk about her symptoms and how to better deal with them when I see her again on Monday.         MARTIN PÉREZ MD             D: 10/27/2017 19:27   T: 10/27/2017 20:24   MT: TD      Name:     JIM DAVIS   MRN:      [id number]        Account:      [id number]   :      1980           Admitted:     [id number]      Document: [id number]

## 2017-10-29 PROCEDURE — 12400007 ZZH R&B MH INTERMEDIATE UMMC

## 2017-10-29 PROCEDURE — A9270 NON-COVERED ITEM OR SERVICE: HCPCS | Mod: GY | Performed by: PSYCHIATRY & NEUROLOGY

## 2017-10-29 PROCEDURE — 25000132 ZZH RX MED GY IP 250 OP 250 PS 637: Mod: GY | Performed by: PSYCHIATRY & NEUROLOGY

## 2017-10-29 RX ADMIN — QUETIAPINE FUMARATE 400 MG: 200 TABLET, FILM COATED ORAL at 22:25

## 2017-10-29 RX ADMIN — HYDROXYZINE HYDROCHLORIDE 50 MG: 25 TABLET ORAL at 09:13

## 2017-10-29 RX ADMIN — LITHIUM CARBONATE 300 MG: 300 CAPSULE, GELATIN COATED ORAL at 22:25

## 2017-10-29 RX ADMIN — LAMOTRIGINE 200 MG: 100 TABLET ORAL at 22:24

## 2017-10-29 RX ADMIN — TRAZODONE HYDROCHLORIDE 50 MG: 50 TABLET ORAL at 22:24

## 2017-10-29 RX ADMIN — LAMOTRIGINE 200 MG: 100 TABLET ORAL at 09:00

## 2017-10-29 ASSESSMENT — ACTIVITIES OF DAILY LIVING (ADL)
ORAL_HYGIENE: INDEPENDENT
DRESS: SCRUBS (BEHAVIORAL HEALTH);INDEPENDENT
ORAL_HYGIENE: INDEPENDENT
LAUNDRY: WITH SUPERVISION
GROOMING: SHOWER;INDEPENDENT
HYGIENE/GROOMING: INDEPENDENT
DRESS: INDEPENDENT

## 2017-10-29 NOTE — PLAN OF CARE
"Problem: Depressive Symptoms  Goal: Depressive Symptoms  Signs and symptoms of listed problems will be absent or manageable.   Outcome: Improving  48 Hour Assessment    Pt visible in the milieu for the majority of the evening. Pt spent time watching movies with fellow patients, and attending Community meeting. She states that she has been having a tough time, and her anxiety and agitation have been higher the past few days. \"I'm very sensitive to noise and scent, so there are some things here that have made me agitated.\" Pt states that hydroxyzine prn has helped even out her anxiety. In addition, pt states feeling hopeless, as she has tried ECT before, but worries that it won't be effective this time. Pt also expressed concern that her  is becoming frustrated with her continued illness and hospital stay. Writer encouraged pt to focus on being healthy, so she can discharge home.     SI: Pt states that she has chronic thoughts, but currently denies active thoughts or plan.      SIB: Pt denies     Auditory/Visual Hallucinations: Pt currently denies     Quality of sleep: States not being able to sleep well at night, due to heightened anxiety and agitation.    No additional concerns at this time. Will continue to monitor and assess.      "

## 2017-10-30 PROCEDURE — 12400001 ZZH R&B MH UMMC

## 2017-10-30 PROCEDURE — 25000132 ZZH RX MED GY IP 250 OP 250 PS 637: Mod: GY | Performed by: PSYCHIATRY & NEUROLOGY

## 2017-10-30 PROCEDURE — A9270 NON-COVERED ITEM OR SERVICE: HCPCS | Mod: GY | Performed by: PSYCHIATRY & NEUROLOGY

## 2017-10-30 PROCEDURE — 97150 GROUP THERAPEUTIC PROCEDURES: CPT | Mod: GO

## 2017-10-30 PROCEDURE — 99233 SBSQ HOSP IP/OBS HIGH 50: CPT | Performed by: PSYCHIATRY & NEUROLOGY

## 2017-10-30 RX ORDER — QUETIAPINE FUMARATE 300 MG/1
300 TABLET, FILM COATED ORAL AT BEDTIME
Status: DISCONTINUED | OUTPATIENT
Start: 2017-10-30 | End: 2017-11-02 | Stop reason: HOSPADM

## 2017-10-30 RX ORDER — PAROXETINE 10 MG/1
20 TABLET, FILM COATED ORAL DAILY
Status: DISCONTINUED | OUTPATIENT
Start: 2017-10-30 | End: 2017-11-02

## 2017-10-30 RX ADMIN — LORATADINE 10 MG: 10 TABLET, ORALLY DISINTEGRATING ORAL at 22:03

## 2017-10-30 RX ADMIN — HYDROXYZINE HYDROCHLORIDE 50 MG: 25 TABLET ORAL at 17:57

## 2017-10-30 RX ADMIN — LITHIUM CARBONATE 300 MG: 300 CAPSULE, GELATIN COATED ORAL at 22:04

## 2017-10-30 RX ADMIN — QUETIAPINE FUMARATE 300 MG: 300 TABLET, FILM COATED ORAL at 22:03

## 2017-10-30 RX ADMIN — LAMOTRIGINE 200 MG: 100 TABLET ORAL at 22:03

## 2017-10-30 RX ADMIN — LAMOTRIGINE 200 MG: 100 TABLET ORAL at 08:08

## 2017-10-30 RX ADMIN — PAROXETINE HYDROCHLORIDE 20 MG: 10 TABLET, FILM COATED ORAL at 12:56

## 2017-10-30 RX ADMIN — LORATADINE 10 MG: 10 TABLET, ORALLY DISINTEGRATING ORAL at 00:38

## 2017-10-30 ASSESSMENT — ACTIVITIES OF DAILY LIVING (ADL)
ORAL_HYGIENE: INDEPENDENT
ORAL_HYGIENE: INDEPENDENT
LAUNDRY: WITH SUPERVISION
DRESS: INDEPENDENT
LAUNDRY: WITH SUPERVISION
DRESS: INDEPENDENT
GROOMING: INDEPENDENT
GROOMING: INDEPENDENT

## 2017-10-30 NOTE — PROGRESS NOTES
[facility], [place]   Psychiatric Progress Note        Interim History:   The patient's care was discussed with the treatment team during the daily team meeting and/or staff's chart notes were reviewed.  Staff report patient was pleasant and social with peers, took her meds. She was upset after learning that her  would not come to visit her at the hospital, then, talked to her mother, watched movie and calmed down. She was seen alone. During this visit we had approximately 20 min long conference call with patient's mother. Patient stated that she had read print out re: Borderline personality disorder and Bipolar affective disorder and differences between them and felt that she more fit criteria for Borderline personality disorder than Bipolar affective disorder. This was also confirmed by her mother who also saw significant mood lability and periods of excitement, but not lasting more than few hours or day or two and not going to the extremes of genuine manic episodes. Gustavo was concerned she was taking mood stabilizers, not antidepressants. I explained to her that mood stabilizers were also used for Borderline personality disorder. We discussed her past med trials: she had tried most of SSRIs, but not Paxil or Luvox. She had tried Depakote, Lithium, Effexor, Wiibryd. She could not recall being on Cymbalta. We agreed to start her on Paxil and small dose of Seroquel prn for anxiety. She said that Vistaril was not helping her and has a history of overdosing on Klonopin. We discussed her request for a 2nd opinion for ECT. I openly told her and her mother that ECT would be most helpful for severe depression, not for depression complicated by Borderline personality disorder, but I would still request 2nd opinion for ECT consult from Justin Calvillo MD. We discussed follow up plans: patient has already had DBT, but didn't like it, based on her explanations it sounded like that DBT  "therapists were too direct and required participation and work. She eventually, agreed that she might go to DBT after first being stabilized at [facility] or Day Treatment.         Medications:       QUEtiapine  300 mg Oral At Bedtime     PARoxetine  20 mg Oral Daily     lamoTRIgine  200 mg Oral BID     lithium  300 mg Oral Daily at 8 pm          Allergies:     Allergies   Allergen Reactions     Seasonal Allergies           Labs:   No results found for this or any previous visit (from the past 24 hour(s)).       Psychiatric Examination:     /76  Pulse 98  Temp 98.5  F (36.9  C)  Resp 16  Ht 1.676 m (5' 6\")  Wt 66.2 kg (146 lb)  LMP 10/10/2017  SpO2 99%  BMI 23.57 kg/m2  Weight is 146 lbs 0 oz  Body mass index is 23.57 kg/(m^2).  Orthostatic Vitals     None            Appearance: awake, alert and appeared as age stated  Attitude:  somewhat cooperative  Eye Contact:  fair  Mood:  anxious and sad   Affect:  appropriate and in normal range  Speech:  clear, coherent  Psychomotor Behavior:  no evidence of tardive dyskinesia, dystonia, or tics and intact station, gait and muscle tone  Throught Process:  linear and goal oriented  Associations:  no loose associations  Thought Content:  no evidence of suicidal ideation or homicidal ideation and no evidence of psychotic thought  Insight:  partial  Judgement:  fair  Oriented to:  time, person, and place  Attention Span and Concentration:  fair  Recent and Remote Memory:  fair         Precautions:     Behavioral Orders   Procedures     Code 1 - Restrict to Unit     Routine Programming     As clinically indicated     Status 15     Every 15 minutes.     Suicide precautions          DIagnoses:     Bipolar type 2 disorder, generalized anxiety disorder, possible borderline personality disorder versus borderline personality disorder traits.          Plan:   Will per above discussion decrease evening Seroquel, start PRN Seroquel. Will also start Paxil. Will request 2nd opinion " ECT consult.

## 2017-10-30 NOTE — PROGRESS NOTES
10/30/17 1447   Behavioral Health   Hallucinations denies / not responding to hallucinations   Thinking intact   Orientation person: oriented;place: oriented;date: oriented;time: oriented   Memory baseline memory   Insight admits / accepts   Judgement impaired   Eye Contact at examiner   Affect full range affect   Mood mood is calm   Physical Appearance/Attire attire appropriate to age and situation   Hygiene well groomed   Suicidality (none stated)   Self Injury (none stated)   Elopement (none shown)   Activity (visible in milieu)   Speech clear;coherent   Medication Sensitivity no stated side effects   Psychomotor / Gait balanced   Psycho Education   Type of Intervention structured groups   Response participates, initiates socially appropriate   Hours 0.5   Activities of Daily Living   Hygiene/Grooming independent   Oral Hygiene independent   Dress independent   Laundry with supervision   Room Organization independent   pt visible in milieu and social with peers and staff. Pt napping and attending groups throughout shift. Pt eating and drinking well. Pt is calm and cooperative. Pt denies SI and SIB.

## 2017-10-30 NOTE — PROGRESS NOTES
"Attended 1.0  Of 3.0 OT groups today.   Pt participated in OT clinic and was able to initiate task, follow through with plan and ask for help as needed.      Pt has been given a Self Assessment form, explained the purpose of including them in their treatment plan and offered options for meeting their needs.     Pt identified reason for hospitalization (\"medicine withdrawal, suicidal ideation\"), minimal coping skills (\"talking to someone, exercise, relaxation, setting limits, journal writing, spirituality\"), FAIR supports outside the hospital (mom, PHP) and personal strengths (\"Creative\").      Pt verbalized understanding of symptoms on function. Pt identified experiencing the following symptoms, selected from checklist provided:     Feelings - sadness, anxiety / fear, anger / rage, mood swings, abandoned,   helpless, depressed, guilt, despair, overwhelmed,  insecure      Thoughts - negative,  slowed thinking,   suicidal,       Behaviors -   problems starting / completing projects,  financial concerns / spending problems,   trouble asking for help,  eating too much or to little,      Pt identifies as need area:    Improve self-esteem/confidence  Improve management of anxiety     In the past week, pt identified being   SEVERELY   BOTHERED due to lack of energy because of poor sleep and identified sleep satisfaction as   POOR    OT PLAN:  Encourage ongoing attendance to support pt self-stated goals. Provide opportunities for education, reinforcement and practice of positive coping skills, engage in graded opportunities for success, and provide ongoing assessment as needed.         "

## 2017-10-30 NOTE — PROGRESS NOTES
Pt calm and pleasant this shift. Out in milieu but mostly keeping to herself. Pt says she just hopes to talk to doctor and fix her medication this week. Says her mood has stabilized over the weekend. Happy with the visit from her mother earlier in the day. Was just a bit upset that her  did not come visit which prompted rumination of negative thoughts. Otherwise, no other concerns. At the end of shift as pt's roommate was put on SIO, pt wondered of leaving from hospital because she was worried she would not be unable to sleep. Pt able to be assured with several options and decided she can stay for the night, hoping to switch rooms tomorrow.         10/29/17 9725   Behavioral Health   Hallucinations denies / not responding to hallucinations   Thinking intact   Orientation person: oriented;place: oriented;date: oriented;time: oriented   Memory baseline memory   Insight admits / accepts   Judgement impaired   Eye Contact at examiner   Affect full range affect   Mood mood is calm;depressed   Physical Appearance/Attire appears stated age;attire appropriate to age and situation;neat   Hygiene well groomed   Suicidality other (see comments)  (denies)   Self Injury other (see comment)  (denies)   Elopement (none)   Activity other (see comment);isolative  (mostly keeping to self but present in milieu)   Speech clear;coherent   Medication Sensitivity no observed side effects;no stated side effects   Psychomotor / Gait steady;balanced   Activities of Daily Living   Hygiene/Grooming independent   Oral Hygiene independent   Dress scrubs (behavioral health);independent   Room Organization independent

## 2017-10-31 PROCEDURE — 97150 GROUP THERAPEUTIC PROCEDURES: CPT | Mod: GO

## 2017-10-31 PROCEDURE — A9270 NON-COVERED ITEM OR SERVICE: HCPCS | Mod: GY | Performed by: PSYCHIATRY & NEUROLOGY

## 2017-10-31 PROCEDURE — 25000132 ZZH RX MED GY IP 250 OP 250 PS 637: Mod: GY | Performed by: PSYCHIATRY & NEUROLOGY

## 2017-10-31 PROCEDURE — 90853 GROUP PSYCHOTHERAPY: CPT

## 2017-10-31 PROCEDURE — 12400001 ZZH R&B MH UMMC

## 2017-10-31 PROCEDURE — 99232 SBSQ HOSP IP/OBS MODERATE 35: CPT | Mod: GC | Performed by: PSYCHIATRY & NEUROLOGY

## 2017-10-31 RX ORDER — PROPRANOLOL HYDROCHLORIDE 10 MG/1
10 TABLET ORAL 3 TIMES DAILY
Status: DISCONTINUED | OUTPATIENT
Start: 2017-11-01 | End: 2017-11-02

## 2017-10-31 RX ADMIN — PAROXETINE HYDROCHLORIDE 20 MG: 10 TABLET, FILM COATED ORAL at 08:56

## 2017-10-31 RX ADMIN — QUETIAPINE FUMARATE 300 MG: 300 TABLET, FILM COATED ORAL at 21:56

## 2017-10-31 RX ADMIN — HYDROXYZINE HYDROCHLORIDE 50 MG: 25 TABLET ORAL at 12:12

## 2017-10-31 RX ADMIN — LITHIUM CARBONATE 300 MG: 300 CAPSULE, GELATIN COATED ORAL at 21:55

## 2017-10-31 RX ADMIN — LAMOTRIGINE 200 MG: 100 TABLET ORAL at 21:56

## 2017-10-31 RX ADMIN — OLANZAPINE 5 MG: 5 TABLET, FILM COATED ORAL at 13:37

## 2017-10-31 RX ADMIN — LAMOTRIGINE 200 MG: 100 TABLET ORAL at 08:56

## 2017-10-31 RX ADMIN — LORATADINE 10 MG: 10 TABLET, ORALLY DISINTEGRATING ORAL at 12:13

## 2017-10-31 ASSESSMENT — ACTIVITIES OF DAILY LIVING (ADL)
DRESS: INDEPENDENT
ORAL_HYGIENE: INDEPENDENT
LAUNDRY: WITH SUPERVISION
GROOMING: INDEPENDENT

## 2017-10-31 NOTE — PROGRESS NOTES
This is the patient's first psychiatric admission to [facility] and she came down from [facility] on a transfer because she did not like her roommate there.  Patient sees Dr. Lacie Randall at [facility] ([phone])  She also sees a Therapist Nora Fernandez at [facility].  Patient is interested in an Adult Day Tx Program but is not yet quite ready to complete an intake as she is quite anxious.  Some medication changes are also being made.  Patient presented with MDD, Anxiety Disorder, and Borderline Personality Disorder.      Met with Cinda to discuss her outpatient care.  She has an upcoming appt with Dr. Randall on 11-8 at 9:30am.  She also wants resources for other mental health clinics if she decides not to see Dr. Randall anymore. The appointment along with several mental health clinic referrals have been added to her d/c instructions.

## 2017-10-31 NOTE — PROGRESS NOTES
Psychiatry Cross Cover Note    S: Notified by intake that unit nursing supervisor is requesting that Cinda be transferred to another unit as her roommate has been quite agitated and needs a room to herself. There are no beds left at 20, thus necessitating a transfer to another unit. [facility] has a female double room that remains available. Patient has been calm/cooperative this hospital stay and is agreeable to this transfer.     I discussed this transfer with charge nurse on both station 20 and 10. They are both in agreement with this transfer. Also discussed with on-call attending Dr. Marc who agrees with this plan. Transfer order placed for patient to go to station 10. Dr. Dye assigned as new attending.       Taqueria Sullivan MD  PGY-2 Psychiatry Resident

## 2017-10-31 NOTE — PROGRESS NOTES
"Pt has been visible about 2/3 of the shift. Pt attended between 50-75% of the groups. Pt asked for the TV to not have the news on because it increases her anxiety and her hands \"tremble\". Pt is glad that she's on this unit; \"It's a lot quieter and I'm more calm\". Pt denies SI and SIB thoughts.     10/31/17 1336   Behavioral Health   Hallucinations denies / not responding to hallucinations   Thinking distractable   Orientation person: oriented;place: oriented;date: oriented;time: oriented   Memory baseline memory   Insight insight appropriate to situation   Judgement impaired   Eye Contact at examiner   Affect (neutral with minimal range)   Mood anxious   Physical Appearance/Attire appears stated age;attire appropriate to age and situation   Hygiene (adequate)   Suicidality (denies)   Self Injury (denies)   Elopement (no signs of eloping)   Activity (WDL) WDL   Speech coherent;clear   Medication Sensitivity no stated side effects;no observed side effects   Psychomotor / Gait balanced;steady   Coping/Psychosocial   Verbalized Emotional State anxiety   Psycho Education   Type of Intervention 1:1 intervention   Response participates with cues/redirection   Hours 0.5   Treatment Detail (wellness strategies)   Behavioral Health Interventions   Depression maintain safety precautions;monitor need to revise level of observation;maintain safe secure environment;encourage participation / independence with adls;provide emotional support;establish therapeutic relationship;assist with developing and utilizing healthy coping strategies   Social and Therapeutic Interventions (Depression) encourage socialization with peers;encourage effective boundaries with peers;encourage participation in therapeutic groups and milieu activities     "

## 2017-10-31 NOTE — PROGRESS NOTES
Pt arrived on [facility] in transfer from [facility]. Pleasant, polite, cooperative. Welcomed and given brief orientation to unit. Oriented to room and introduced to roommate. Denies requests or needs at present time.

## 2017-10-31 NOTE — PROGRESS NOTES
Spoke to on-call provider, as well as accepting resident. Order placed by resident for patient transfer. Called unit 10 to arrange pt transfer.    Pt walked down to station 10 by staff at 2111. In no acute distress.

## 2017-10-31 NOTE — PLAN OF CARE
Problem: Depressive Symptoms  Goal: Depressive Symptoms  Signs and symptoms of listed problems will be absent or manageable.   Outcome: Improving  48 Hour Assessment    Pt approached writer and stated that she would like to sign a 12 hour intent to leave. Pt was calm during this interaction, and stated that her main reason for wanting to leave is her lack of ability to fall and stay asleep, as well as overstimulation from noise on the unit. Pt reports that she would like to attend partial hospitalization, and writer encouraged her to speak to  regarding this.    After speaking to provider regarding 12 hour intent, pt was encouraged by provider to stay on unit until Wednesday, which she was agreeable to.     Pt continues to express frustration and heightened anxiety due to her sleeping and room situation. Writer spoke with nursing supervisor in attempts to move patient rooms. Have not heard back regarding this.    Requested prn hydroxyzine. Received 50 mg.     SI/SIB: Currently denies     Auditory/Visual Hallucinations: Denies     Quality of sleep: States sleeping poorly, due to room situation.    No additional concerns at this time. Will continue to monitor and assess.
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
## 2017-11-01 PROCEDURE — A9270 NON-COVERED ITEM OR SERVICE: HCPCS | Mod: GY | Performed by: STUDENT IN AN ORGANIZED HEALTH CARE EDUCATION/TRAINING PROGRAM

## 2017-11-01 PROCEDURE — 25000132 ZZH RX MED GY IP 250 OP 250 PS 637: Mod: GY | Performed by: PSYCHIATRY & NEUROLOGY

## 2017-11-01 PROCEDURE — 12400007 ZZH R&B MH INTERMEDIATE UMMC

## 2017-11-01 PROCEDURE — A9270 NON-COVERED ITEM OR SERVICE: HCPCS | Mod: GY | Performed by: PSYCHIATRY & NEUROLOGY

## 2017-11-01 PROCEDURE — 90853 GROUP PSYCHOTHERAPY: CPT

## 2017-11-01 PROCEDURE — 99232 SBSQ HOSP IP/OBS MODERATE 35: CPT | Mod: GC | Performed by: PSYCHIATRY & NEUROLOGY

## 2017-11-01 PROCEDURE — 25000132 ZZH RX MED GY IP 250 OP 250 PS 637: Mod: GY | Performed by: STUDENT IN AN ORGANIZED HEALTH CARE EDUCATION/TRAINING PROGRAM

## 2017-11-01 RX ADMIN — LAMOTRIGINE 200 MG: 100 TABLET ORAL at 09:21

## 2017-11-01 RX ADMIN — PROPRANOLOL HYDROCHLORIDE 10 MG: 10 TABLET ORAL at 21:10

## 2017-11-01 RX ADMIN — PROPRANOLOL HYDROCHLORIDE 10 MG: 10 TABLET ORAL at 16:23

## 2017-11-01 RX ADMIN — LITHIUM CARBONATE 300 MG: 300 CAPSULE, GELATIN COATED ORAL at 21:10

## 2017-11-01 RX ADMIN — PROPRANOLOL HYDROCHLORIDE 10 MG: 10 TABLET ORAL at 00:31

## 2017-11-01 RX ADMIN — QUETIAPINE FUMARATE 300 MG: 300 TABLET, FILM COATED ORAL at 21:10

## 2017-11-01 RX ADMIN — LORATADINE 10 MG: 10 TABLET, ORALLY DISINTEGRATING ORAL at 21:11

## 2017-11-01 RX ADMIN — PROPRANOLOL HYDROCHLORIDE 10 MG: 10 TABLET ORAL at 09:21

## 2017-11-01 RX ADMIN — LAMOTRIGINE 200 MG: 100 TABLET ORAL at 21:10

## 2017-11-01 RX ADMIN — PAROXETINE HYDROCHLORIDE 20 MG: 10 TABLET, FILM COATED ORAL at 09:21

## 2017-11-01 ASSESSMENT — ACTIVITIES OF DAILY LIVING (ADL)
ORAL_HYGIENE: INDEPENDENT
HYGIENE/GROOMING: INDEPENDENT
DRESS: INDEPENDENT
LAUNDRY: WITH SUPERVISION

## 2017-11-01 NOTE — PROGRESS NOTES
11/01/17 1418   Behavioral Health   Hallucinations denies / not responding to hallucinations   Thinking distractable   Orientation person: oriented;place: oriented   Insight insight appropriate to situation   Judgement impaired   Eye Contact at examiner   Affect (Neutral)   Mood mood is calm   Physical Appearance/Attire attire appropriate to age and situation   Hygiene well groomed   Suicidality other (see comments)  (Denies it)   Self Injury other (see comment)  (Denies it)   Speech clear     Pt was in and out of her room. Makes need known with staff. Was up for breakfast, and took a shower. Affect looked neutral. Stated her goal is to make it to some of the groups today. Pt is future oriented about discharge, and possibly doing an out patient program. Denies thoughts to hurt self or others. Contracts for safety.

## 2017-11-01 NOTE — PROGRESS NOTES
Patient believes her first dose of propranolol helped with her anxiety but made her drowsy and she did not want to take her 1400 dose. States she will ask doctor tomorrow about decreased dose or frequency.

## 2017-11-01 NOTE — PROGRESS NOTES
Attended 2.5 of 3.0 OT groups offered. Pleasant, cooperative and social. Initiated all groups and participated in creative expressions and goal setting groups. Pt was given and completed a written self assessment. OT purpose was explained with a value of having involvement in tx plan, and provided options to meet self identified goals. Will assess further in the areas of organization, problem solving, and concentration. Decisive with good planning, problem solving and attention to details. Attentive to discussion regarding goals and change and was able to begin setting a goal for self. Was called out early to meet with treatment team. Was encouraged to complete the form to begin personal change.

## 2017-11-01 NOTE — PROGRESS NOTES
"   10/31/17 2100   Behavioral Health   Hallucinations denies / not responding to hallucinations   Thinking intact   Orientation person: oriented;place: oriented   Memory baseline memory   Insight insight appropriate to situation   Judgement impaired   Eye Contact at examiner   Affect full range affect   Mood anxious;mood is calm   Physical Appearance/Attire neat   Hygiene well groomed   Suicidality other (see comments)  (denies )   Self Injury other (see comment)  (denies )   Elopement (None stated or observed )   Activity other (see comment)  (visible in the milieu and socialized with others)   Speech clear;coherent   Activities of Daily Living   Hygiene/Grooming independent   Oral Hygiene independent   Dress independent   Laundry with supervision   Room Organization independent       Pt denied SI and SIB.  Pt reported feeling \" anxious (4) \" just nervous about everything.\"  Pt reported overall feeling \"good.\"  Pt seems calm, ate supper, visible in the milieu, but did not attended group.  Pt daily goal \" I didn't have one not today.\"  Pt goal after discharge \" definitely do PHP and DBT.\" Pt reported good appetite, good sleep \" now that I'm down here,\"no pain and no SE's.   Pt reported feeling hopeful.  Pt is independent with ADL's.  Pt wants visitors.    "
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
## 2017-11-02 VITALS
TEMPERATURE: 97.7 F | SYSTOLIC BLOOD PRESSURE: 122 MMHG | DIASTOLIC BLOOD PRESSURE: 82 MMHG | BODY MASS INDEX: 23.11 KG/M2 | OXYGEN SATURATION: 99 % | RESPIRATION RATE: 16 BRPM | HEIGHT: 66 IN | WEIGHT: 143.8 LBS | HEART RATE: 91 BPM

## 2017-11-02 PROCEDURE — 25000132 ZZH RX MED GY IP 250 OP 250 PS 637: Mod: GY | Performed by: PSYCHIATRY & NEUROLOGY

## 2017-11-02 PROCEDURE — 90853 GROUP PSYCHOTHERAPY: CPT

## 2017-11-02 PROCEDURE — 25000132 ZZH RX MED GY IP 250 OP 250 PS 637: Mod: GY | Performed by: STUDENT IN AN ORGANIZED HEALTH CARE EDUCATION/TRAINING PROGRAM

## 2017-11-02 PROCEDURE — 97150 GROUP THERAPEUTIC PROCEDURES: CPT | Mod: GO

## 2017-11-02 PROCEDURE — A9270 NON-COVERED ITEM OR SERVICE: HCPCS | Mod: GY | Performed by: STUDENT IN AN ORGANIZED HEALTH CARE EDUCATION/TRAINING PROGRAM

## 2017-11-02 PROCEDURE — A9270 NON-COVERED ITEM OR SERVICE: HCPCS | Mod: GY | Performed by: PSYCHIATRY & NEUROLOGY

## 2017-11-02 PROCEDURE — 99239 HOSP IP/OBS DSCHRG MGMT >30: CPT | Mod: GC | Performed by: PSYCHIATRY & NEUROLOGY

## 2017-11-02 RX ORDER — LITHIUM CARBONATE 450 MG
450 TABLET, EXTENDED RELEASE ORAL DAILY
Qty: 30 TABLET | Refills: 0 | Status: SHIPPED | OUTPATIENT
Start: 2017-11-02 | End: 2017-11-02

## 2017-11-02 RX ORDER — PAROXETINE 10 MG/1
10 TABLET, FILM COATED ORAL ONCE
Status: DISCONTINUED | OUTPATIENT
Start: 2017-11-02 | End: 2017-11-02

## 2017-11-02 RX ORDER — QUETIAPINE FUMARATE 300 MG/1
300 TABLET, FILM COATED ORAL AT BEDTIME
Qty: 30 TABLET | Refills: 0 | Status: SHIPPED | OUTPATIENT
Start: 2017-11-02

## 2017-11-02 RX ORDER — PAROXETINE 30 MG/1
30 TABLET, FILM COATED ORAL AT BEDTIME
Qty: 30 TABLET | Refills: 0 | Status: SHIPPED | OUTPATIENT
Start: 2017-11-02 | End: 2017-11-02

## 2017-11-02 RX ORDER — QUETIAPINE FUMARATE 300 MG/1
300 TABLET, FILM COATED ORAL AT BEDTIME
Qty: 30 TABLET | Refills: 0 | Status: SHIPPED | OUTPATIENT
Start: 2017-11-02 | End: 2017-11-02

## 2017-11-02 RX ORDER — PROPRANOLOL HYDROCHLORIDE 20 MG/1
20 TABLET ORAL 3 TIMES DAILY
Status: DISCONTINUED | OUTPATIENT
Start: 2017-11-02 | End: 2017-11-02 | Stop reason: HOSPADM

## 2017-11-02 RX ORDER — PROPRANOLOL HYDROCHLORIDE 20 MG/1
20 TABLET ORAL 3 TIMES DAILY
Qty: 90 TABLET | Refills: 0 | Status: SHIPPED | OUTPATIENT
Start: 2017-11-02

## 2017-11-02 RX ORDER — PAROXETINE 10 MG/1
10 TABLET, FILM COATED ORAL ONCE
Status: COMPLETED | OUTPATIENT
Start: 2017-11-02 | End: 2017-11-02

## 2017-11-02 RX ORDER — PAROXETINE 10 MG/1
30 TABLET, FILM COATED ORAL AT BEDTIME
Status: DISCONTINUED | OUTPATIENT
Start: 2017-11-02 | End: 2017-11-02 | Stop reason: HOSPADM

## 2017-11-02 RX ORDER — PROPRANOLOL HYDROCHLORIDE 20 MG/1
20 TABLET ORAL 3 TIMES DAILY
Qty: 90 TABLET | Refills: 0 | Status: SHIPPED | OUTPATIENT
Start: 2017-11-02 | End: 2017-11-02

## 2017-11-02 RX ORDER — LITHIUM CARBONATE 450 MG
450 TABLET, EXTENDED RELEASE ORAL DAILY
Qty: 30 TABLET | Refills: 0 | Status: SHIPPED | OUTPATIENT
Start: 2017-11-02

## 2017-11-02 RX ORDER — PAROXETINE 30 MG/1
30 TABLET, FILM COATED ORAL AT BEDTIME
Qty: 30 TABLET | Refills: 0 | Status: SHIPPED | OUTPATIENT
Start: 2017-11-02

## 2017-11-02 RX ADMIN — HYDROXYZINE HYDROCHLORIDE 50 MG: 25 TABLET ORAL at 02:10

## 2017-11-02 RX ADMIN — PAROXETINE 10 MG: 10 TABLET, FILM COATED ORAL at 17:40

## 2017-11-02 RX ADMIN — HYDROXYZINE HYDROCHLORIDE 50 MG: 25 TABLET ORAL at 09:07

## 2017-11-02 RX ADMIN — LAMOTRIGINE 200 MG: 100 TABLET ORAL at 09:07

## 2017-11-02 RX ADMIN — PAROXETINE HYDROCHLORIDE 20 MG: 10 TABLET, FILM COATED ORAL at 09:07

## 2017-11-02 RX ADMIN — PROPRANOLOL HYDROCHLORIDE 20 MG: 20 TABLET ORAL at 14:23

## 2017-11-02 RX ADMIN — TRAZODONE HYDROCHLORIDE 50 MG: 50 TABLET ORAL at 01:48

## 2017-11-02 RX ADMIN — PROPRANOLOL HYDROCHLORIDE 10 MG: 10 TABLET ORAL at 09:07

## 2017-11-02 ASSESSMENT — ACTIVITIES OF DAILY LIVING (ADL)
GROOMING: INDEPENDENT
LAUNDRY: WITH SUPERVISION
GROOMING: INDEPENDENT
DRESS: INDEPENDENT
DRESS: INDEPENDENT
ORAL_HYGIENE: INDEPENDENT
ORAL_HYGIENE: INDEPENDENT

## 2017-11-02 NOTE — PLAN OF CARE
"Problem: Depressive Symptoms  Goal: Depressive Symptoms  Signs and symptoms of listed problems will be absent or manageable.   Outcome: Improving  In the morning patient reported anxiety severe enough to keep her from going to group. Patient received scheduled medications as well as vistaril 50 mg. Patient requested and received an ice pack and stated she was able to do some deep breathing exercises. States these were temporarily effective in relieving anxiety. During this period of increased anxiety she had made a statement of her suicidality \"creeping back in\" and worried that she would not be able to care for her son at home. When asked about this later she states she feels calm now and she does not feel suicidal. Has a plan to call a crisis line if suicidal thoughts occur while outpatient. Patient states she would like to be discharged tomorrow because her mother and  will be able to help care for her son for the next few days while she transitions back to home.       "

## 2017-11-02 NOTE — PROGRESS NOTES
Patient now has outpatient set up to include: Psychiatry Dr. Pitts 11/8 at 9:30; [facility] Intake 11/9 at 9am;  Patient to call her therapist at [facility] and schedule another appointment at her convenience. It is her plan to return home to her family at discharge.

## 2017-11-02 NOTE — PROGRESS NOTES
"Attended 2 of 3 OT groups offered. Declined the first group as she was\" too anxious\". Returned to participate in creative expressions group. Needed cuing to set self up and begin work on creative task. Followed cuing and followed through with her creative  Plans. Neat workmanship with good attention to details. Reported the benefits she recieved from being involved in creative tasks. Also, indicated she will follow through when she returns home. Pleasant and social with peers.  "

## 2017-11-02 NOTE — DISCHARGE INSTRUCTIONS
Behavioral Discharge Planning and Instructions      Summary:  You were admitted on 10/26/2017  due to Anxiety and Major Depressive Disorder, recurrent; severe.  You were treated by Dr. Lisette Dye MD and discharged on 11/2/17 from [facility] to your home to continue to follow-up with your providers listed below.    Principal Diagnosis: Major Depressive Disorder; Recurrent, Severe; Anxiety disorder;      Health Care Follow-up Appointments:   Dr. Lacie Randall (Psychiatric Med [facility]) - Next Appt on Wed Nov 8th at 9:30am  [facility]  [address]  [place], [place]  [address] [phone]  [phone]    Therapist Nora Fernandez (Pt to schedule at your convenience after discharge)  [facility]  [address] [address] [address]  [place], [place]  [address] [phone]    [facility] Intake on Thursday, Nov 9th at 9am with Marilyn Iraheta in Room 14 - [facility].  If you have questions, their number is [phone].    Other Mental Health Clinic Resources per your request:  [facility] [phone]  [facility] [phone]  [facility] [phone]    DBT Referral: [facility]  When you are nearing completion of the Adult Day Program, call them to set up an Intake.      Attend all scheduled appointments with your outpatient providers. Call at least 24 hours in advance if you need to reschedule an appointment to ensure continued access to your outpatient providers.   Major Treatments, Procedures and Findings:  You were provided with: a psychiatric assessment, assessed for medical stability, medication evaluation and/or management, group therapy, milieu management and medical interventions    Symptoms to Report: losing more sleep, mood getting worse or thoughts of suicide    Early warning signs can include: increased  depression or anxiety sleep disturbances increased thoughts or behaviors of suicide or self-harm     Safety and Wellness:  Take all medicines as directed.  Make no changes unless your doctor suggests them.      Follow treatment recommendations.  Refrain from alcohol and non-prescribed drugs.  If there is a concern for safety, call 911.    Resources:   Crisis Intervention: 929.230.5891 or [phone] (TTY: [phone]).  Call anytime for help.  [employer] (www.[place].marg[place].org): 697.778.9073 or 452-310-9040.    The treatment team has appreciated the opportunity to work with you.     If you have any questions or concerns our unit number is [phone].
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
## 2017-11-03 NOTE — DISCHARGE SUMMARY
Discharge instructions reviewed with patient, paper works signed, including future appointments. Left with all of her belongings. Denied having  depressive symptoms or suicidal ideations.

## 2019-11-02 ENCOUNTER — HEALTH MAINTENANCE LETTER (OUTPATIENT)
Age: 39
End: 2019-11-02

## 2020-02-10 ENCOUNTER — HEALTH MAINTENANCE LETTER (OUTPATIENT)
Age: 40
End: 2020-02-10

## 2020-11-14 ENCOUNTER — HEALTH MAINTENANCE LETTER (OUTPATIENT)
Age: 40
End: 2020-11-14

## 2021-04-03 ENCOUNTER — HEALTH MAINTENANCE LETTER (OUTPATIENT)
Age: 41
End: 2021-04-03

## 2021-09-12 ENCOUNTER — HEALTH MAINTENANCE LETTER (OUTPATIENT)
Age: 41
End: 2021-09-12

## 2022-04-24 ENCOUNTER — HEALTH MAINTENANCE LETTER (OUTPATIENT)
Age: 42
End: 2022-04-24

## 2022-11-19 ENCOUNTER — HEALTH MAINTENANCE LETTER (OUTPATIENT)
Age: 42
End: 2022-11-19

## 2023-06-01 ENCOUNTER — HEALTH MAINTENANCE LETTER (OUTPATIENT)
Age: 43
End: 2023-06-01